# Patient Record
Sex: MALE | Race: WHITE | Employment: FULL TIME | URBAN - NONMETROPOLITAN AREA
[De-identification: names, ages, dates, MRNs, and addresses within clinical notes are randomized per-mention and may not be internally consistent; named-entity substitution may affect disease eponyms.]

---

## 2022-02-24 DIAGNOSIS — Z98.61 POST PTCA: ICD-10-CM

## 2022-02-24 DIAGNOSIS — E55.9 VITAMIN D DEFICIENCY: ICD-10-CM

## 2022-02-24 DIAGNOSIS — I21.9 MYOCARDIAL INFARCTION, UNSPECIFIED MI TYPE, UNSPECIFIED ARTERY (HCC): ICD-10-CM

## 2022-02-24 DIAGNOSIS — I25.10 CORONARY ARTERY DISEASE INVOLVING NATIVE CORONARY ARTERY OF NATIVE HEART WITHOUT ANGINA PECTORIS: Primary | ICD-10-CM

## 2022-02-24 DIAGNOSIS — E78.5 HYPERLIPIDEMIA, UNSPECIFIED HYPERLIPIDEMIA TYPE: ICD-10-CM

## 2022-03-08 RX ORDER — ASPIRIN 81 MG/1
81 TABLET ORAL DAILY
Qty: 90 TABLET | Refills: 1
Start: 2022-03-08

## 2022-03-08 RX ORDER — METOPROLOL SUCCINATE 25 MG/1
25 TABLET, EXTENDED RELEASE ORAL DAILY
Qty: 90 TABLET | Refills: 1
Start: 2022-03-08 | End: 2022-03-16

## 2022-03-08 RX ORDER — NITROGLYCERIN 0.4 MG/1
0.4 TABLET SUBLINGUAL PRN
Qty: 25 TABLET | Refills: 3
Start: 2022-03-08

## 2022-03-08 RX ORDER — ATORVASTATIN CALCIUM 80 MG/1
80 TABLET, FILM COATED ORAL DAILY
Qty: 90 TABLET | Refills: 1
Start: 2022-03-08

## 2022-03-08 RX ORDER — LISINOPRIL 20 MG/1
20 TABLET ORAL 2 TIMES DAILY
Qty: 180 TABLET | Refills: 1
Start: 2022-03-08 | End: 2022-09-22 | Stop reason: DRUGHIGH

## 2022-03-16 ENCOUNTER — HOSPITAL ENCOUNTER (OUTPATIENT)
Age: 47
Discharge: HOME | End: 2022-03-16
Payer: OTHER GOVERNMENT

## 2022-03-16 ENCOUNTER — OFFICE VISIT (OUTPATIENT)
Dept: CARDIOLOGY CLINIC | Age: 47
End: 2022-03-16
Payer: OTHER GOVERNMENT

## 2022-03-16 VITALS
WEIGHT: 194 LBS | HEART RATE: 56 BPM | DIASTOLIC BLOOD PRESSURE: 80 MMHG | OXYGEN SATURATION: 98 % | SYSTOLIC BLOOD PRESSURE: 128 MMHG

## 2022-03-16 DIAGNOSIS — I21.9 MYOCARDIAL INFARCTION, UNSPECIFIED MI TYPE, UNSPECIFIED ARTERY (HCC): ICD-10-CM

## 2022-03-16 DIAGNOSIS — E55.9 VITAMIN D DEFICIENCY: ICD-10-CM

## 2022-03-16 DIAGNOSIS — I25.10 CORONARY ARTERY DISEASE INVOLVING NATIVE CORONARY ARTERY OF NATIVE HEART WITHOUT ANGINA PECTORIS: ICD-10-CM

## 2022-03-16 DIAGNOSIS — I25.10 CORONARY ARTERY DISEASE INVOLVING NATIVE CORONARY ARTERY OF NATIVE HEART WITHOUT ANGINA PECTORIS: Primary | ICD-10-CM

## 2022-03-16 DIAGNOSIS — R06.02 SOB (SHORTNESS OF BREATH): ICD-10-CM

## 2022-03-16 DIAGNOSIS — E78.5 HYPERLIPIDEMIA, UNSPECIFIED HYPERLIPIDEMIA TYPE: ICD-10-CM

## 2022-03-16 DIAGNOSIS — Z98.61 POST PTCA: ICD-10-CM

## 2022-03-16 LAB
ABSOLUTE EOS #: 0.6 K/UL (ref 0–0.4)
ABSOLUTE LYMPH #: 2.1 K/UL (ref 1–4.8)
ABSOLUTE MONO #: 0.5 K/UL (ref 0–1)
ALBUMIN SERPL-MCNC: 4.6 G/DL (ref 3.5–5.2)
ALP BLD-CCNC: 82 U/L (ref 40–129)
ALT SERPL-CCNC: 39 U/L (ref 5–41)
ANION GAP SERPL CALCULATED.3IONS-SCNC: 11 MMOL/L (ref 9–17)
AST SERPL-CCNC: 35 U/L
BASOPHILS # BLD: 1 % (ref 0–2)
BASOPHILS ABSOLUTE: 0.1 K/UL (ref 0–0.2)
BILIRUB SERPL-MCNC: 0.38 MG/DL (ref 0.3–1.2)
BUN BLDV-MCNC: 19 MG/DL (ref 6–20)
BUN/CREAT BLD: 17 (ref 9–20)
CALCIUM SERPL-MCNC: 9.6 MG/DL (ref 8.6–10.4)
CHLORIDE BLD-SCNC: 103 MMOL/L (ref 98–107)
CHOLESTEROL/HDL RATIO: 2.7
CHOLESTEROL: 125 MG/DL
CO2: 26 MMOL/L (ref 20–31)
CREAT SERPL-MCNC: 1.11 MG/DL (ref 0.7–1.2)
DIFFERENTIAL TYPE: YES
EOSINOPHILS RELATIVE PERCENT: 10 % (ref 0–5)
GFR AFRICAN AMERICAN: >60 ML/MIN
GFR NON-AFRICAN AMERICAN: >60 ML/MIN
GFR SERPL CREATININE-BSD FRML MDRD: ABNORMAL ML/MIN/{1.73_M2}
GLUCOSE BLD-MCNC: 109 MG/DL (ref 70–99)
HCT VFR BLD CALC: 41.7 % (ref 41–53)
HDLC SERPL-MCNC: 46 MG/DL
HEMOGLOBIN: 14.3 G/DL (ref 13.5–17.5)
LDL CHOLESTEROL: 62 MG/DL (ref 0–130)
LYMPHOCYTES # BLD: 34 % (ref 13–44)
MAGNESIUM: 2.1 MG/DL (ref 1.6–2.6)
MCH RBC QN AUTO: 31.9 PG (ref 26–34)
MCHC RBC AUTO-ENTMCNC: 34.2 G/DL (ref 31–37)
MCV RBC AUTO: 93.1 FL (ref 80–100)
MONOCYTES # BLD: 7 % (ref 5–9)
PATIENT FASTING?: YES
PDW BLD-RTO: 13.8 % (ref 12.1–15.2)
PLATELET # BLD: 265 K/UL (ref 140–450)
POTASSIUM SERPL-SCNC: 5.3 MMOL/L (ref 3.7–5.3)
RBC # BLD: 4.48 M/UL (ref 4.5–5.9)
SEG NEUTROPHILS: 48 % (ref 39–75)
SEGMENTED NEUTROPHILS ABSOLUTE COUNT: 2.9 K/UL (ref 2.1–6.5)
SODIUM BLD-SCNC: 140 MMOL/L (ref 135–144)
TOTAL PROTEIN: 7.6 G/DL (ref 6.4–8.3)
TRIGL SERPL-MCNC: 84 MG/DL
TSH SERPL DL<=0.05 MIU/L-ACNC: 1.99 MIU/L (ref 0.3–5)
VITAMIN D 25-HYDROXY: 19.8 NG/ML
WBC # BLD: 6.2 K/UL (ref 3.5–11)

## 2022-03-16 PROCEDURE — 82306 VITAMIN D 25 HYDROXY: CPT

## 2022-03-16 PROCEDURE — 85025 COMPLETE CBC W/AUTO DIFF WBC: CPT

## 2022-03-16 PROCEDURE — 84443 ASSAY THYROID STIM HORMONE: CPT

## 2022-03-16 PROCEDURE — 99204 OFFICE O/P NEW MOD 45 MIN: CPT | Performed by: INTERNAL MEDICINE

## 2022-03-16 PROCEDURE — 83735 ASSAY OF MAGNESIUM: CPT

## 2022-03-16 PROCEDURE — 80061 LIPID PANEL: CPT

## 2022-03-16 PROCEDURE — 93005 ELECTROCARDIOGRAM TRACING: CPT

## 2022-03-16 PROCEDURE — 36415 COLL VENOUS BLD VENIPUNCTURE: CPT

## 2022-03-16 PROCEDURE — 80053 COMPREHEN METABOLIC PANEL: CPT

## 2022-03-16 RX ORDER — CLOPIDOGREL BISULFATE 75 MG/1
75 TABLET ORAL DAILY
Qty: 30 TABLET | Refills: 11 | Status: SHIPPED | OUTPATIENT
Start: 2022-03-16 | End: 2022-09-22 | Stop reason: ALTCHOICE

## 2022-03-16 RX ORDER — METOPROLOL SUCCINATE 25 MG/1
12.5 TABLET, EXTENDED RELEASE ORAL EVERY EVENING
COMMUNITY

## 2022-03-16 NOTE — PATIENT INSTRUCTIONS
Decrease metoprolol  25 mg to 1/2 tab daily    Switch Brilinta to plavix after you are done with brilinta.     Take blood pressure daily different times of the day   Keep log bring in when you have stress test.

## 2022-03-16 NOTE — PROGRESS NOTES
Ov DR Natalya Vega establish care   Annabella Drilling to [place] ED transferred   To [facility]. Had MI 10/31/21  Had PTCA x3   Had chest pain for 1 days prior  Came and went   The day he went to ED  Chest pain that didn't go away. No sob at that time   is in cardiac rehab at   [facility].  For 1 more week. Was being see per DR Sarahy Wong. This year insurance will not   [facility]. Pt in the [address] [place] [address],[address] working   In [place]  Pt is a Navigator.  with 4 children   3 boys 1 girl   Wife Sandy Summers teaches theology  at [employer] in [facility]. No chest pain since   No sob   occ dizziness when standing up   bp at home low 100's. Bedside echo done. Decrease toprol to 1/2 tab daily. Will do treadmill stress test and   Echo  Call with results  Will need to clear patient to fly. And letter to travel over sees. In non flying compacity in May.

## 2022-03-17 LAB
EKG ATRIAL RATE: 58 BPM
EKG P AXIS: 26 DEGREES
EKG P-R INTERVAL: 118 MS
EKG Q-T INTERVAL: 408 MS
EKG QRS DURATION: 80 MS
EKG QTC CALCULATION (BAZETT): 400 MS
EKG R AXIS: 44 DEGREES
EKG T AXIS: 67 DEGREES
EKG VENTRICULAR RATE: 58 BPM

## 2022-03-17 PROCEDURE — 93010 ELECTROCARDIOGRAM REPORT: CPT | Performed by: INTERNAL MEDICINE

## 2022-03-21 NOTE — PROGRESS NOTES
Edith Vargas M.D. [address] [place], [place] [address] [address]  [phone]        2022        Karel Pearl MD  [address] [facility]    RE:   Malik Morley  :  1975    Dear Dr. Jailene Pearl:    [address]:  1. Acute anterior wall ST elevation myocardial infarction on 10/31/2021.  2.  Status post cardiac catheterization showing occluded LAD 75% intermediate, circumflex 90%, first OM with stenting of each with an EF of 46%. HISTORY OF PRESENT ILLNESS:  I had the pleasure of seeing Mr. LEZAMA in the office on 2022. Mr. Romero Boggs is a pleasant 49-year-old gentleman who had an acute anterior wall myocardial infarction on 10/31/2021. He had never had a cardiac history. 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 He had never had chest pain or chest discomfort. Mr. Romero Boggs had developed chest pain for several minutes two days prior to admission. It only lasted several minutes and subsided. The day before, he again had chest pain that subsided spontaneously. On 10/31/2021, he developed chest pain and it continued and did not subside. No nausea or diaphoresis. Chest pain was described as heaviness that continued, therefore he went to [place] emergency room where EKG showed acute anterior wall myocardial infarction. We accepted him to [facility] in [place] and took him directly to the cath lab where we did a cardiac catheterization. The catheterization showed severe coronary artery disease. The left coronary artery was a large and dominant vessel, gave rise to PDA and posterior ventricular branch. The right coronary artery had mild plaque disease of 20% to 30%. The left main trunk was unremarkable. The LAD was a very large vessel that wrapped around the apex. There was a stump occlusion just after the origin of the LAD. There was some retrograde filling from the intermediate branch. The intermediate branch of the circumflex had 70% to 75% lesion in its proximal portion and was a large vessel. The circumflex gives rise to a moderate-sized OM where there was a 90% lesion. The EF was in the 45% to 50% range. I did angioplasty and stent placement opening the LAD and placing a 3.0 x 23 mm drug-eluting Xience stent. I also did angioplasty and stent placement in the intermediate branch of the circumflex using a 2.75 x 38 mm drug-eluting Xience stent. The OM was also stented with a 2.5 x 38 mm drug-eluting Xience stent with a good end result. He was discharged and did cardiac rehab in [place]. He saw Dr. Samm Sarmiento who ordered an echocardiogram and a stress test which were not covered at [facility] in [place]. Therefore, he is seeing me to establish his cardiac care. He has done excellent. He has been exercising without discomfort. He has no shortness of breath. No chest pain. His energy level is back to baseline. He has had no syncope, near syncope, lightheadedness,or dizziness. Denies any palpitations. He has never had a history of chest pain or chest discomfort prior to his myocardial infarction and he worked out on a regular basis. An echocardiogram the following day after his catheterization showed an EF of 46% with mild hypokinesis in the inferoseptal, anteroseptal region of the left ventricle. He has completed cardiac rehab in the [facility]. He has noted some lightheadedness when he first stands from a sitting position or from a lying position. He has had no syncope or near syncope. CARDIAC RISK FACTORS:  Prior MI:  Positive. PTCA:  Positive. Hypertension:  Negative. Peripheral Vascular Disease:  Negative. Other Family Members:  Negative. Smoking:  Negative.     MEDICATIONS PRIOR TO ADMISSION:  He is currently on aspirin 81 mg daily, Lipitor 80 mg daily, Proventil 90 mg b.i.d., lisinopril 20 mg b.i.d., Toprol-XL 25 mg daily. PAST MEDICAL AND SURGICAL HISTORY:  1. He had a tonsillectomy on 1985.  2.  He had the cardiac catheterization on 10/31/2021. FAMILY HISTORY:  Mother is alive at 80. Father  of multiple myeloma. He has three brothers, 46, 61, and 58 who are alive and well and a sister 59 is alive and well. SOCIAL HISTORY:  He is in the [employer]. 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 . Four children, three boys and one girl. His wife, Zan Chaudhry, teaches Theology at [employer] in [facility]. REVIEW OF SYSTEMS:  Cardiac as above. Other systems reviewed including constitutional, eyes, ears, nose and throat, cardiovascular, respiratory, GI, , musculoskeletal, integumentary, neurologic, psychiatric, endocrine, hematologic and allergic/immunologic are negative except for what is described above. No weight loss or weight gain. No change in bowel habits. No blood in stools. No fevers, sweats or chills. PHYSICAL EXAMINATION:  VITAL SIGNS:  His blood pressure was 128/80 with a heart rate of 56 and regular. Respiratory rate 18. O2 saturation 98%. Weight 194 pounds. GENERAL:  He is a pleasant 78-year-old gentleman. Denied pain. He was oriented to person, place and time. Answered questions appropriately. SKIN:  No unusual skin changes. HEENT:  The pupils are equally round and intact. Mucous membranes were dry. NECK:  No JVD. Good carotid pulses. No carotid bruits. No lymphadenopathy or thyromegaly. CARDIOVASCULAR EXAM:  S1 and S2 were normal.  No S3 or S4. Soft systolic blowing type murmur. No diastolic murmur. PMI was normal.  No lift, thrust, or pericardial friction rub. LUNGS:  Quite clear to auscultation and percussion. ABDOMEN:  Soft and nontender. Good bowel sounds. The aorta was not enlarged. No hepatomegaly, splenomegaly. EXTREMITIES:  Good femoral pulses. Good pedal pulses. No pedal edema.   Skin was warm and dry.  No calf tenderness. Nail beds pink. Good cap refill. PULSES:  Bilateral symmetrical radial, brachial and carotid pulses. No carotid bruits. Good femoral and pedal pulses. NEUROLOGIC EXAM:  Within normal limits. PSYCHIATRIC EXAM:  Within normal limits. LABORATORY DATA:  His sodium was 140, potassium 5.3, BUN 19, creatinine 1.11, GFR greater than 60. Magnesium 2.1. Glucose 109. Calcium was 9.6. Cholesterol 125, HDL 46, LDL 62, triglycerides 84. ALT was 39, AST was 35. TSH was 1.99. Vitamin D was 19.8. White count 6.2, hemoglobin 14.3 with a platelet count 146,556. His EKG showed mild sinus bradycardia at 58 beats per minute, otherwise normal EKG. Chest x-ray not done. IMPRESSION:  1. Status post acute anterior wall myocardial infection on 10/31/2021.  2.  Acute cardiac catheterization on 10/31/2021, showing an occluded LAD just after its origin with 75% disease in the intermediate branch of the circumflex, 90% disease in the OM branch of the circumflex with unremarkable large dominant right coronary artery with an EF of 45% to 50%. 3.  Angioplasty of the LAD placing a 3.0 x 23 mm drug-eluting Xience stent and angioplasty of the intermediate branch of the circumflex placing a 2.75 x 38 mm drug-eluting Xience stent and angioplasty of the OM branch of the circumflex with a 2.5 x 38 mm drug-eluting Xience stent. 4.  Completion of cardiac rehab with excellent exercise capacity and no issues during rehab.  5.  Hyperlipidemia, under excellent control. 6.  Mild bradycardia with heart rate of 58 beats per minute. 7.  Normal EKG, except for very mild bradycardia. PLAN:  1.  Echocardiogram to look at LV size and function. 2.  Treadmill stress test as he has a normal EKG. 3.  Continue risk modification as he is already doing. 4.  Decrease Toprol to 25 mg half a tablet daily. 5.  Stop Brilinta and start Plavix 75 mg daily.   I did stop the Brilinta and place him on Plavix since it is only one a day with the Plavix. DISCUSSION:  Mr. Macy Robin had an anterior wall myocardial infarction on 10/31. Fortunately, he got in early and his LAD was reopened and stented. His OM and intermediate branch of the circumflex were also stented. He has had no chest pain or chest discomfort, shortness of breath, loss of energy since that time. He completed cardiac rehab and he has done excellent cardiac rehab. Because his heart rate was 58 here, I will decrease his Toprol from 25 mg to half a tablet or 12.5 mg daily. We will do a treadmill stress test and echocardiogram to look at his exercise capacity and his LV function. At this point, there is no limitations to his activity. There is no weight restriction. I did change his medication from Brilinta to Plavix. From my standpoint, he is cleared to fly. He has also cleared to travel overseas in a nonflying capacity in May. His prognosis is excellent. I will check his EF, but I would expect that it is back in the normal range again. I did do a bedside echocardiogram which did show normal LV function. We will do an official echocardiogram also to document. I will see him again in 6 months and if he is doing well at that time, we will see him on a yearly basis. He is very fortunate that he came to the hospital early and could get intervention really in the course of his myocardial infarction. Thank you very much for allowing me the privilege of seeing Mr. BARTH. If you have any questions on my thoughts, please do not hesitate to contact me.         Mann Roper    D: 03/20/2022 15:13:11     T: 03/20/2022 15:20:51     [email or web address]  Job#: [id number]   Doc#: [id number]

## 2022-03-22 ENCOUNTER — HOSPITAL ENCOUNTER (OUTPATIENT)
Dept: NON INVASIVE DIAGNOSTICS | Age: 47
Discharge: HOME | End: 2022-03-22
Payer: OTHER GOVERNMENT

## 2022-03-22 ENCOUNTER — OFFICE VISIT (OUTPATIENT)
Dept: CARDIOLOGY CLINIC | Age: 47
End: 2022-03-22
Payer: OTHER GOVERNMENT

## 2022-03-22 VITALS
WEIGHT: 189 LBS | OXYGEN SATURATION: 97 % | DIASTOLIC BLOOD PRESSURE: 74 MMHG | SYSTOLIC BLOOD PRESSURE: 115 MMHG | HEART RATE: 62 BPM

## 2022-03-22 DIAGNOSIS — R06.02 SOB (SHORTNESS OF BREATH): ICD-10-CM

## 2022-03-22 DIAGNOSIS — R07.9 CHEST PAIN, UNSPECIFIED TYPE: Primary | ICD-10-CM

## 2022-03-22 DIAGNOSIS — I25.10 CORONARY ARTERY DISEASE INVOLVING NATIVE CORONARY ARTERY OF NATIVE HEART WITHOUT ANGINA PECTORIS: ICD-10-CM

## 2022-03-22 LAB
LV EF: 60 %
LVEF MODALITY: NORMAL

## 2022-03-22 PROCEDURE — 93306 TTE W/DOPPLER COMPLETE: CPT

## 2022-03-22 PROCEDURE — 99214 OFFICE O/P EST MOD 30 MIN: CPT | Performed by: INTERNAL MEDICINE

## 2022-03-22 PROCEDURE — 93017 CV STRESS TEST TRACING ONLY: CPT

## 2022-03-22 NOTE — PROGRESS NOTES
Ov Dr Brigida Huddleston follow up same  Day echo and treadmill. No chest pain or sob  No dizziness. Pt is cleared to fly   Will  disk and records   On Monday.

## 2022-03-22 NOTE — PROCEDURES
[place] [id number]                              CARDIAC STRESS TEST    PATIENT NAME: Bipin Osorio                   :        1975  MED REC NO:   [id number]                              ROOM:  ACCOUNT NO:   [de-identified]                           ADMIT DATE: 2022  PROVIDER:     Suzie Arzola    DATE OF STUDY:  2022    TREADMILL STRESS TEST    He exercised 8 minutes on an accelerated Spike protocol achieving 13.2  METs. His peak heart rate was 160 which is 93% of maximum predicted  heart rate. He had no chest pain, no ST depression, and no arrhythmias. This was overall normal treadmill stress test with no indication of  ischemia at peak workload. Alber Clement    D: 2022 11:33:42       T: 2022 12:42:06     [email or web address]  Job#: [id number]     Doc#: [id number]    CC:  Sam Mcelroy.  Kendall Park MD

## 2022-03-27 NOTE — PROGRESS NOTES
Patience Parks MD  [facility]  [address], [place] [address]  [phone]      2022      Karen Villa. Schuyler Rowan MD  [address] [facility], [address] [facility]      RE:   Jesica Cristobal  :  1975      Dear Dr. Schuyler Rowan:    I met with Mr. Jesica Cristobal after his echocardiogram and his cardiac stress test done today. His echocardiogram looks excellent. His ejection fraction is now normal with an EF of 60%. There are no wall motion abnormalities present. His stress test was excellent. He has a very good exercise capacity. He exercised 8 minutes on an accelerated Spike protocol achieving a maximum heart rate of 160 which is 93% of maximum predicted heart rate. He achieved 13.2 METs. He had no EKG changes and no chest pain during his stress test.  It was overall normal stress test with an excellent exercise capacity. From a cardiac standpoint, Mr. Emerald Gould is on unlimited activity. I pushed him very hard on his stress test today and there were absolutely no issues that arose. His LV function has also normalized with an EF of 60%. As I outlined in my initial H and P, he has no residual areas of stenosis. His LAD has 0% residual stenosis as does his intermediate and OM branch of the circumflex. His right coronary artery has mild plaque disease of 20% to 30%. I will include a copy of my initial H and P as well as a copy of his stress test and his cardiac catheterization, as well as the echocardiogram.  Again, there are no restrictions from a cardiac standpoint and with now his essentially normal coronary arteries after stenting of the LAD and circumflex and normal LV function, his risk of any cardiac event is extremely low at this time. His risk factors have been modified very nicely. If there is any question concerning Mr. Bah's cardiac status, please feel free to contact me.   Thank you very much.    Sincerely,        Juan Alberto Goldberg    D: 03/22/2022 11:31:55     T: 03/22/2022 11:34:16     [email or web address]  Job#: [id number]   Doc#: [id number]

## 2022-09-19 ENCOUNTER — HOSPITAL ENCOUNTER (OUTPATIENT)
Age: 47
Discharge: HOME | End: 2022-09-19
Payer: OTHER GOVERNMENT

## 2022-09-19 DIAGNOSIS — I21.9 MYOCARDIAL INFARCTION, UNSPECIFIED MI TYPE, UNSPECIFIED ARTERY (HCC): ICD-10-CM

## 2022-09-19 DIAGNOSIS — E78.5 HYPERLIPIDEMIA, UNSPECIFIED HYPERLIPIDEMIA TYPE: ICD-10-CM

## 2022-09-19 DIAGNOSIS — I25.10 CORONARY ARTERY DISEASE INVOLVING NATIVE CORONARY ARTERY OF NATIVE HEART WITHOUT ANGINA PECTORIS: ICD-10-CM

## 2022-09-19 DIAGNOSIS — E55.9 VITAMIN D DEFICIENCY: ICD-10-CM

## 2022-09-19 LAB
ABSOLUTE EOS #: 0.11 K/UL (ref 0–0.4)
ABSOLUTE LYMPH #: 2.81 K/UL (ref 1–4.8)
ABSOLUTE MONO #: 0.16 K/UL (ref 0–1)
ALBUMIN SERPL-MCNC: 4.6 G/DL (ref 3.5–5.2)
ALP BLD-CCNC: 95 U/L (ref 40–129)
ALT SERPL-CCNC: 28 U/L (ref 5–41)
ANION GAP SERPL CALCULATED.3IONS-SCNC: 9 MMOL/L (ref 9–17)
AST SERPL-CCNC: 29 U/L
BASOPHILS # BLD: 1 % (ref 0–2)
BASOPHILS ABSOLUTE: 0.05 K/UL (ref 0–0.2)
BILIRUB SERPL-MCNC: 0.6 MG/DL (ref 0.3–1.2)
BUN BLDV-MCNC: 13 MG/DL (ref 6–20)
BUN/CREAT BLD: 13 (ref 9–20)
CALCIUM SERPL-MCNC: 9.6 MG/DL (ref 8.6–10.4)
CHLORIDE BLD-SCNC: 100 MMOL/L (ref 98–107)
CHOLESTEROL/HDL RATIO: 2.7
CHOLESTEROL: 128 MG/DL
CO2: 27 MMOL/L (ref 20–31)
CREAT SERPL-MCNC: 0.99 MG/DL (ref 0.7–1.2)
DIFFERENTIAL TYPE: YES
EKG ATRIAL RATE: 56 BPM
EKG P AXIS: 23 DEGREES
EKG P-R INTERVAL: 132 MS
EKG Q-T INTERVAL: 414 MS
EKG QRS DURATION: 84 MS
EKG QTC CALCULATION (BAZETT): 399 MS
EKG R AXIS: 67 DEGREES
EKG T AXIS: 70 DEGREES
EKG VENTRICULAR RATE: 56 BPM
EOSINOPHILS RELATIVE PERCENT: 2 % (ref 0–5)
GFR AFRICAN AMERICAN: >60 ML/MIN
GFR NON-AFRICAN AMERICAN: >60 ML/MIN
GFR SERPL CREATININE-BSD FRML MDRD: ABNORMAL ML/MIN/{1.73_M2}
GLUCOSE BLD-MCNC: 102 MG/DL (ref 70–99)
HCT VFR BLD CALC: 40.7 % (ref 41–53)
HDLC SERPL-MCNC: 48 MG/DL
HEMOGLOBIN: 13.8 G/DL (ref 13.5–17.5)
LDL CHOLESTEROL: 66 MG/DL (ref 0–130)
LYMPHOCYTES # BLD: 53 % (ref 13–44)
MAGNESIUM: 1.9 MG/DL (ref 1.6–2.6)
MCH RBC QN AUTO: 31.3 PG (ref 26–34)
MCHC RBC AUTO-ENTMCNC: 33.8 G/DL (ref 31–37)
MCV RBC AUTO: 92.7 FL (ref 80–100)
MONOCYTES # BLD: 3 % (ref 5–9)
MORPHOLOGY: ABNORMAL
PDW BLD-RTO: 13 % (ref 12.1–15.2)
PLATELET # BLD: 266 K/UL (ref 140–450)
POTASSIUM SERPL-SCNC: 5.4 MMOL/L (ref 3.7–5.3)
RBC # BLD: 4.39 M/UL (ref 4.5–5.9)
SEG NEUTROPHILS: 41 % (ref 39–75)
SEGMENTED NEUTROPHILS ABSOLUTE COUNT: 2.17 K/UL (ref 2.1–6.5)
SODIUM BLD-SCNC: 136 MMOL/L (ref 135–144)
TOTAL PROTEIN: 7.6 G/DL (ref 6.4–8.3)
TRIGL SERPL-MCNC: 70 MG/DL
TSH SERPL DL<=0.05 MIU/L-ACNC: 2.41 UIU/ML (ref 0.3–5)
VITAMIN D 25-HYDROXY: 44.2 NG/ML
WBC # BLD: 5.3 K/UL (ref 3.5–11)

## 2022-09-19 PROCEDURE — 36415 COLL VENOUS BLD VENIPUNCTURE: CPT

## 2022-09-19 PROCEDURE — 82306 VITAMIN D 25 HYDROXY: CPT

## 2022-09-19 PROCEDURE — 80053 COMPREHEN METABOLIC PANEL: CPT

## 2022-09-19 PROCEDURE — 85025 COMPLETE CBC W/AUTO DIFF WBC: CPT

## 2022-09-19 PROCEDURE — 80061 LIPID PANEL: CPT

## 2022-09-19 PROCEDURE — 93005 ELECTROCARDIOGRAM TRACING: CPT

## 2022-09-19 PROCEDURE — 83735 ASSAY OF MAGNESIUM: CPT

## 2022-09-19 PROCEDURE — 84443 ASSAY THYROID STIM HORMONE: CPT

## 2022-09-19 PROCEDURE — 93010 ELECTROCARDIOGRAM REPORT: CPT | Performed by: INTERNAL MEDICINE

## 2022-09-22 ENCOUNTER — OFFICE VISIT (OUTPATIENT)
Dept: CARDIOLOGY CLINIC | Age: 47
End: 2022-09-22
Payer: OTHER GOVERNMENT

## 2022-09-22 VITALS
DIASTOLIC BLOOD PRESSURE: 70 MMHG | HEART RATE: 61 BPM | OXYGEN SATURATION: 98 % | WEIGHT: 188 LBS | SYSTOLIC BLOOD PRESSURE: 120 MMHG

## 2022-09-22 DIAGNOSIS — E78.5 HYPERLIPIDEMIA, UNSPECIFIED HYPERLIPIDEMIA TYPE: ICD-10-CM

## 2022-09-22 DIAGNOSIS — I25.10 CORONARY ARTERY DISEASE INVOLVING NATIVE CORONARY ARTERY OF NATIVE HEART WITHOUT ANGINA PECTORIS: Primary | ICD-10-CM

## 2022-09-22 DIAGNOSIS — R07.9 CHEST PAIN, UNSPECIFIED TYPE: ICD-10-CM

## 2022-09-22 PROCEDURE — 99214 OFFICE O/P EST MOD 30 MIN: CPT | Performed by: INTERNAL MEDICINE

## 2022-09-22 RX ORDER — LISINOPRIL 20 MG/1
10 TABLET ORAL 2 TIMES DAILY
COMMUNITY
End: 2022-10-07 | Stop reason: DRUGHIGH

## 2022-09-22 NOTE — PATIENT INSTRUCTIONS
May Stop Plavix now     Stay on ASA    Will Decrease Lisinopril 20 mg to   1/2 tablet twice a day (will now be   10 mg twice a day)    Monitor BP/HR daily - different times of the day   -call in 2 weeks with readings    Will follow up in 1 yr

## 2022-09-22 NOTE — PROGRESS NOTES
Ov Dr. Yanira Smith for 6 month follow up   June 8th-had another cath d/t needing it  To return to work -was not having any issues  Back to work full time no restrictions  Doing well   C/o some lightheadedness when getting up   From chair or bed in am   Hoping to retired in 3 yrs   3 boys 1 girl   Oldest is dt[place] - stay home mom with   2 children 3rd on the way  Son -Michelle Moran - 22   Son Grady Sheriff is in [facility] [employer]   Son -        - 16 is a JR in [address] [place] [address]         May Stop Plavix now     Stay on ASA    Will Decrease Lisinopril 20 mg to   1/2 tablet twice a day (will now be   10 mg twice a day)    Monitor BP/HR daily - different times of the day   -call in 2 weeks with readings    Will follow up in 1 yr

## 2022-09-26 NOTE — PROGRESS NOTES
to 50%. We did angioplasty and stent placement, opening the LAD and placing a 3.0 x 23 mm drug-eluting Xience stent. We also placed a stent in the intermediate branch of the circumflex using a 2.75 x 38 mm drug-eluting Xience stent. The OM was stented with a 2.5 x 38 mm drug-eluting Xience stent with a good end result. I had done a stress test and echocardiogram when I had seen him in 2022. However, this was in preparation for going back to work. However, the [employer] required a repeat catheterization, which was done on 2022, in [place]. This was essentially normal with no restenosis of any of his stents. Normal LV function. He has done excellent since that time. He is exercising on a regular basis. He still is working at [employer] in [place]. 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 He has four years left until longterm, and therefore, he will not go back to school but will learn there on the job. He does complain of some lightheadedness when he first stands up from a chair or from bed in the morning. CARDIAC RISK FACTORS:  Prior MI:  Positive. PTCA:  Positive. Hypertension:  Negative. Peripheral Vascular Disease:  Negative. Other Family Members:  Negative. Smoking:  Negative. MEDICATIONS AT THIS TIME:  He is on aspirin 81 mg daily, Lipitor 80 mg daily, lisinopril 20 mg b.i.d., Toprol-XL 25 mg half a tablet in the evening and Plavix 75 mg daily. PAST MEDICAL AND SURGICAL HISTORY:  1. Tonsillectomy on 1985.  2.  Cardiac catheterization on 10/31/2021, as stated previously. 3.  Repeat catheterization on 2022, by the [employer] showing widely patent stents with mild plaque disease. FAMILY HISTORY:  Mother alive at 80. Father  of multiple myeloma. Three brothers, 46, 64 and 61, alive and well. A sister, 72, alive and well.     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  to Stefan Dinh who teaches theology at [employer] in [facility] one day a week. He has four children with a daughter, Padma Rawls, who is 29, has two children with a third on the way; Kyra Tapia is, 22; Carolina Vasquez, 21, in [facility]; and his youngest son is 16, in [employer], wants to go into engineering. Kyra Tapia does not smoke or drink alcohol. REVIEW OF SYSTEMS:  Cardiac as above. Other systems reviewed including constitutional, eyes, ears, nose and throat, cardiovascular, respiratory, GI, , musculoskeletal, integumentary, neurologic, endocrine, hematologic and allergic/immunologic are negative except for what is described above. No weight loss or weight gain. No change in bowel habits, no blood in stools. No fevers, sweats or chills. PHYSICAL EXAMINATION:  VITAL SIGNS:  His blood pressure was 120/70 with a heart rate of 61 and regular. Respirations 18. O2 sat 98%. Weight 188 pounds. GENERAL:  He is a pleasant 73-year-old gentleman. Denied pain. He was oriented to person, place and time. Answered questions appropriately. SKIN:  No unusual skin changes. HEENT:  The pupils are equally round and intact. Mucous membranes were dry. NECK:  No JVD. Good carotid pulses. No carotid bruits. No lymphadenopathy or thyromegaly. CARDIOVASCULAR EXAM:  S1 and S2 were normal.  No S3 or S4. Soft systolic blowing type murmur. No diastolic murmur. PMI was normal.  No lift, thrust, or pericardial friction rub. LUNGS:  Clear to auscultation and percussion. ABDOMEN:  Soft and nontender. Good bowel sounds. EXTREMITIES:  Good femoral pulses. Good pedal pulses. No pedal edema. Skin was warm and dry. No calf tenderness. Nail beds pink. Good cap refill. PULSES:  Bilateral symmetrical radial, brachial and carotid pulses. No carotid bruits. Good femoral and pedal pulses. NEUROLOGIC EXAM:  Within normal limits. PSYCHIATRIC EXAM:  Within normal limits.     LABORATORY DATA: His sodium was 136, potassium 5.4, BUN 13, creatinine 0.99, GFR greater than 60, magnesium 1.9, glucose 102, calcium 9.6. Cholesterol 128, HDL 48, LDL 66, triglycerides 70. ALT was 28, AST was 29. TSH 2.41. Vitamin D 44.2. White count 5.3, hemoglobin 13.8 with a platelet count of 922,405. His EKG showed normal sinus rhythm and was normal.    His echocardiogram on 06/08/2022, was normal with an EF of 55%. IMPRESSION:  1. Coronary artery disease. 2.  Anterior myocardial infarction on 10/31/2021, with a catheterization showing occluded LAD after its origin, with 75% disease in the intermediate branch of the circumflex, 90% disease in the OM branch of the circumflex, with unremarkable large right coronary artery, EF of 45% to 50%. 3.  Angioplasty of the LAD placing 3.0 x 23 mm drug-eluting Xience stent, angioplasty of the intermediate branch of the circumflex placing 2.75 x 38 mm Xience stent, and angioplasty of the OM branch of the circumflex placing 2.5 x 38 mm drug-eluting Xience stent. 4.  Repeat catheterization on [facility]08/2022, by [facility], that showed widely patent stents with mild plaque disease. 5.  Normal EF of 60% by an echocardiogram by the  on 06/08/2022. 6.  Hyperlipidemia, under excellent control. 7.  Mild hypotension now with dizziness when standing due to his blood pressure at home being in the 95 to 100 range. PLAN:  1. Stop Plavix as it has been one year since his angioplasty. 2.  Decrease lisinopril from 20 mg b.i.d. to 10 mg b.i.d.  3.  He will take his blood pressure daily and call us in two weeks to make sure that it is in the 120 range at home, and if it is not, we will decrease his lisinopril again to 10 mg once a day at bedtime. 4.  See in one year. DISCUSSION:  Mr. Curt Halsted is doing excellent. He is exercising on a regular basis and feels very good.   He had a repeat catheterization on 06/08, before he could return as being a navigator for [employer] and his catheterization was essentially normal with widely patent stents. He is on an excellent risk modification program.    His blood pressure does run somewhat low and we will decrease his lisinopril from 20 mg to 10 mg b.i.d.    I am delighted at his overall well-being. I did tell him if he had any \"soft signs\" of any issues such as mild shortness of breath or loss of energy or, of course, any chest pain, I would want to know immediately. I will consider doing a stress test in two to three years as he had minimal symptoms prior to his myocardial infarction. Thank you very much for allowing me the privilege of seeing Mr. NOLAN. If you have any questions on my thoughts, please do not hesitate to contact me.     Sincerely,        Lianne Medina    D: 09/22/2022 14:54:28     T: 09/22/2022 15:00:56     [email or web address]  Job#: [id number]   Doc#: [id number]

## 2022-10-07 ENCOUNTER — TELEPHONE (OUTPATIENT)
Dept: CARDIOLOGY CLINIC | Age: 47
End: 2022-10-07

## 2022-10-07 RX ORDER — LISINOPRIL 20 MG/1
10 TABLET ORAL DAILY
Qty: 30 TABLET | Refills: 0 | Status: SHIPPED
Start: 2022-10-07 | End: 2022-10-27 | Stop reason: DRUGHIGH

## 2022-10-07 NOTE — TELEPHONE ENCOUNTER
Pt called in with bp log scanned into chart   Pt to decrease lisinopril 20 to 1/2 tab daily  And call again in 2 weeks.

## 2022-10-27 ENCOUNTER — TELEPHONE (OUTPATIENT)
Dept: CARDIOLOGY CLINIC | Age: 47
End: 2022-10-27

## 2022-10-27 RX ORDER — LISINOPRIL 5 MG/1
5 TABLET ORAL EVERY EVENING
COMMUNITY

## 2022-10-27 RX ORDER — LISINOPRIL 5 MG/1
5 TABLET ORAL DAILY
Qty: 30 TABLET | Refills: 11 | Status: SHIPPED | OUTPATIENT
Start: 2022-10-27

## 2022-10-27 NOTE — TELEPHONE ENCOUNTER
Bp readings reviewed  Already taking lisinpril 10 mg in evening   Will decrease to 5 mg   Per Dr. Petty Ours  Pt notified

## 2022-11-15 ENCOUNTER — TELEPHONE (OUTPATIENT)
Dept: CARDIOLOGY CLINIC | Age: 47
End: 2022-11-15

## 2022-12-21 RX ORDER — METOPROLOL SUCCINATE 25 MG/1
12.5 TABLET, EXTENDED RELEASE ORAL EVERY EVENING
Qty: 45 TABLET | Refills: 3 | Status: SHIPPED | OUTPATIENT
Start: 2022-12-21

## 2023-02-14 RX ORDER — ATORVASTATIN CALCIUM 80 MG/1
80 TABLET, FILM COATED ORAL DAILY
Qty: 90 TABLET | Refills: 3 | Status: SHIPPED | OUTPATIENT
Start: 2023-02-14

## 2023-03-02 PROBLEM — I25.10 CAD (CORONARY ARTERY DISEASE): Status: ACTIVE | Noted: 2023-03-02

## 2023-03-02 PROBLEM — Z98.61 POST PTCA: Status: ACTIVE | Noted: 2023-03-02

## 2023-03-02 PROBLEM — I21.9 MYOCARDIAL INFARCT (MULTI): Status: ACTIVE | Noted: 2023-03-02

## 2023-03-02 PROBLEM — E78.2 MIXED HYPERLIPIDEMIA: Status: ACTIVE | Noted: 2023-03-02

## 2023-03-02 RX ORDER — ATORVASTATIN CALCIUM 80 MG/1
TABLET, FILM COATED ORAL
COMMUNITY
Start: 2021-11-05

## 2023-03-02 RX ORDER — NITROGLYCERIN 0.4 MG/1
1 TABLET SUBLINGUAL AS NEEDED
COMMUNITY
Start: 2021-11-05

## 2023-03-02 RX ORDER — LISINOPRIL 2.5 MG/1
1 TABLET ORAL 2 TIMES DAILY
COMMUNITY
Start: 2021-11-05 | End: 2023-03-08 | Stop reason: ALTCHOICE

## 2023-03-02 RX ORDER — METOPROLOL SUCCINATE 25 MG/1
0.5 TABLET, EXTENDED RELEASE ORAL DAILY
COMMUNITY
Start: 2021-11-05

## 2023-03-02 RX ORDER — ASPIRIN 81 MG/1
1 TABLET ORAL DAILY
COMMUNITY
Start: 2021-11-05

## 2023-03-08 ENCOUNTER — OFFICE VISIT (OUTPATIENT)
Dept: PRIMARY CARE | Facility: CLINIC | Age: 48
End: 2023-03-08
Payer: OTHER GOVERNMENT

## 2023-03-08 VITALS
HEART RATE: 74 BPM | OXYGEN SATURATION: 93 % | DIASTOLIC BLOOD PRESSURE: 78 MMHG | WEIGHT: 189.8 LBS | BODY MASS INDEX: 25.71 KG/M2 | HEIGHT: 72 IN | SYSTOLIC BLOOD PRESSURE: 120 MMHG

## 2023-03-08 DIAGNOSIS — Z00.00 WELL ADULT EXAM: Primary | ICD-10-CM

## 2023-03-08 PROCEDURE — 99396 PREV VISIT EST AGE 40-64: CPT | Performed by: FAMILY MEDICINE

## 2023-03-08 PROCEDURE — 1036F TOBACCO NON-USER: CPT | Performed by: FAMILY MEDICINE

## 2023-03-08 RX ORDER — LISINOPRIL 5 MG/1
5 TABLET ORAL DAILY
COMMUNITY

## 2023-03-08 ASSESSMENT — ENCOUNTER SYMPTOMS
PALPITATIONS: 0
HEADACHES: 0
DECREASED CONCENTRATION: 0
CONSTIPATION: 0
SLEEP DISTURBANCE: 0
NAUSEA: 0
ABDOMINAL PAIN: 0
NERVOUS/ANXIOUS: 0
ACTIVITY CHANGE: 0
SHORTNESS OF BREATH: 0
WHEEZING: 0
FATIGUE: 0
DIARRHEA: 0
COUGH: 0
DIZZINESS: 0
BLOOD IN STOOL: 0
CHEST TIGHTNESS: 0

## 2023-03-08 ASSESSMENT — PATIENT HEALTH QUESTIONNAIRE - PHQ9
1. LITTLE INTEREST OR PLEASURE IN DOING THINGS: NOT AT ALL
SUM OF ALL RESPONSES TO PHQ9 QUESTIONS 1 AND 2: 0
2. FEELING DOWN, DEPRESSED OR HOPELESS: NOT AT ALL

## 2023-03-08 NOTE — PROGRESS NOTES
"Subjective   Patient ID: Joey Barr is a 47 y.o. male who presents for Annual Exam.    HPI   Doing well after the heart attack.  Follows with cardiology and they have made adjustments to his medications and everything is stable without any side effects from medications.  He is exercising without any troubles-specifically no chest pain chest pressure shortness of breath diaphoresis.  Exercise tolerance is stable.  He did have an extensive work-up done a second time after the original catheterization with the  in [place] in 2022.    No side effects or problems with medications.  He is stable on all his medications.    Up-to-date on health maintenance issues.    Review of Systems   Constitutional:  Negative for activity change and fatigue.   Respiratory:  Negative for cough, chest tightness, shortness of breath and wheezing.    Cardiovascular:  Negative for chest pain and palpitations.   Gastrointestinal:  Negative for abdominal pain, blood in stool, constipation, diarrhea and nausea.   Skin:  Negative for rash.   Neurological:  Negative for dizziness and headaches.   Psychiatric/Behavioral:  Negative for behavioral problems, decreased concentration and sleep disturbance. The patient is not nervous/anxious.        Objective   /78   Pulse 74   Ht 1.816 m (5' 11.5\")   Wt 86.1 kg (189 lb 12.8 oz)   SpO2 93%   BMI 26.10 kg/m²     Physical Exam  Constitutional:       Appearance: Normal appearance.   HENT:      Head: Normocephalic.   Cardiovascular:      Rate and Rhythm: Normal rate and regular rhythm.      Heart sounds: Normal heart sounds.   Pulmonary:      Effort: Pulmonary effort is normal.      Breath sounds: Normal breath sounds.   Abdominal:      Palpations: Abdomen is soft.   Skin:     General: Skin is warm and dry.   Neurological:      General: No focal deficit present.      Mental Status: He is alert and oriented to person, place, and time.   Psychiatric:         Mood and Affect: Mood " normal.         Behavior: Behavior normal.         Judgment: Judgment normal.         Assessment/Plan   Problem List Items Addressed This Visit    None  Visit Diagnoses       Well adult exam    -  Primary

## 2023-06-26 RX ORDER — NITROGLYCERIN 0.4 MG/1
0.4 TABLET SUBLINGUAL PRN
Qty: 25 TABLET | Refills: 3 | Status: SHIPPED | OUTPATIENT
Start: 2023-06-26

## 2023-09-18 ENCOUNTER — HOSPITAL ENCOUNTER (OUTPATIENT)
Age: 48
Discharge: HOME | End: 2023-09-18
Payer: OTHER GOVERNMENT

## 2023-09-18 ENCOUNTER — HOSPITAL ENCOUNTER (OUTPATIENT)
Dept: GENERAL RADIOLOGY | Age: 48
Discharge: HOME | End: 2023-09-20
Payer: OTHER GOVERNMENT

## 2023-09-18 ENCOUNTER — HOSPITAL ENCOUNTER (OUTPATIENT)
Age: 48
Discharge: HOME | End: 2023-09-20
Payer: OTHER GOVERNMENT

## 2023-09-18 DIAGNOSIS — R07.9 CHEST PAIN, UNSPECIFIED TYPE: ICD-10-CM

## 2023-09-18 DIAGNOSIS — E78.5 HYPERLIPIDEMIA, UNSPECIFIED HYPERLIPIDEMIA TYPE: ICD-10-CM

## 2023-09-18 DIAGNOSIS — I25.10 CORONARY ARTERY DISEASE INVOLVING NATIVE CORONARY ARTERY OF NATIVE HEART WITHOUT ANGINA PECTORIS: ICD-10-CM

## 2023-09-18 LAB
ALBUMIN SERPL-MCNC: 4.3 G/DL (ref 3.5–5.2)
ALP SERPL-CCNC: 84 U/L (ref 40–129)
ALT SERPL-CCNC: 26 U/L (ref 5–41)
ANION GAP SERPL CALCULATED.3IONS-SCNC: 11 MMOL/L (ref 9–17)
AST SERPL-CCNC: 23 U/L
BASOPHILS # BLD: ABNORMAL K/UL (ref 0–0.2)
BASOPHILS NFR BLD: ABNORMAL % (ref 0–2)
BILIRUB SERPL-MCNC: 0.4 MG/DL (ref 0.3–1.2)
BUN SERPL-MCNC: 13 MG/DL (ref 6–20)
BUN/CREAT SERPL: 13 (ref 9–20)
CALCIUM SERPL-MCNC: 9.1 MG/DL (ref 8.6–10.4)
CHLORIDE SERPL-SCNC: 102 MMOL/L (ref 98–107)
CHOLEST SERPL-MCNC: 116 MG/DL
CHOLESTEROL/HDL RATIO: 3.1
CO2 SERPL-SCNC: 26 MMOL/L (ref 20–31)
CREAT SERPL-MCNC: 1 MG/DL (ref 0.7–1.2)
EKG ATRIAL RATE: 51 BPM
EKG P AXIS: 16 DEGREES
EKG P-R INTERVAL: 126 MS
EKG Q-T INTERVAL: 428 MS
EKG QRS DURATION: 86 MS
EKG QTC CALCULATION (BAZETT): 394 MS
EKG R AXIS: 53 DEGREES
EKG T AXIS: 73 DEGREES
EKG VENTRICULAR RATE: 51 BPM
EOSINOPHIL # BLD: 0.28 K/UL (ref 0–0.4)
EOSINOPHILS RELATIVE PERCENT: 5 % (ref 0–5)
ERYTHROCYTE [DISTWIDTH] IN BLOOD BY AUTOMATED COUNT: 12.9 % (ref 12.1–15.2)
GFR SERPL CREATININE-BSD FRML MDRD: >60 ML/MIN/1.73M2
GLUCOSE SERPL-MCNC: 111 MG/DL (ref 70–99)
HCT VFR BLD AUTO: 42.8 % (ref 41–53)
HDLC SERPL-MCNC: 37 MG/DL
HGB BLD-MCNC: 14.7 G/DL (ref 13.5–17.5)
IMM GRANULOCYTES # BLD AUTO: ABNORMAL K/UL (ref 0–0.3)
IMM GRANULOCYTES NFR BLD: ABNORMAL %
LDLC SERPL CALC-MCNC: 65 MG/DL (ref 0–130)
LYMPHOCYTES NFR BLD: 2.63 K/UL (ref 1–4.8)
LYMPHOCYTES RELATIVE PERCENT: 47 % (ref 13–44)
MAGNESIUM SERPL-MCNC: 2.1 MG/DL (ref 1.6–2.6)
MCH RBC QN AUTO: 31.9 PG (ref 26–34)
MCHC RBC AUTO-ENTMCNC: 34.4 G/DL (ref 31–37)
MCV RBC AUTO: 92.7 FL (ref 80–100)
MONOCYTES NFR BLD: 0.67 K/UL (ref 0–1)
MONOCYTES NFR BLD: 12 % (ref 5–9)
MORPHOLOGY: ABNORMAL
NEUTROPHILS NFR BLD: 36 % (ref 39–75)
NEUTS SEG NFR BLD: 2.02 K/UL (ref 2.1–6.5)
PATIENT FASTING?: YES
PLATELET # BLD AUTO: 268 K/UL (ref 140–450)
POTASSIUM SERPL-SCNC: 4.7 MMOL/L (ref 3.7–5.3)
PROT SERPL-MCNC: 7.3 G/DL (ref 6.4–8.3)
RBC # BLD AUTO: 4.62 M/UL (ref 4.5–5.9)
SODIUM SERPL-SCNC: 139 MMOL/L (ref 135–144)
TRIGL SERPL-MCNC: 69 MG/DL
TSH SERPL DL<=0.05 MIU/L-ACNC: 1.12 UIU/ML (ref 0.3–5)
WBC OTHER # BLD: 5.6 K/UL (ref 3.5–11)

## 2023-09-18 PROCEDURE — 84443 ASSAY THYROID STIM HORMONE: CPT

## 2023-09-18 PROCEDURE — 71046 X-RAY EXAM CHEST 2 VIEWS: CPT

## 2023-09-18 PROCEDURE — 85025 COMPLETE CBC W/AUTO DIFF WBC: CPT

## 2023-09-18 PROCEDURE — 80061 LIPID PANEL: CPT

## 2023-09-18 PROCEDURE — 36415 COLL VENOUS BLD VENIPUNCTURE: CPT

## 2023-09-18 PROCEDURE — 83735 ASSAY OF MAGNESIUM: CPT

## 2023-09-18 PROCEDURE — 93010 ELECTROCARDIOGRAM REPORT: CPT | Performed by: INTERNAL MEDICINE

## 2023-09-18 PROCEDURE — 80053 COMPREHEN METABOLIC PANEL: CPT

## 2023-09-18 PROCEDURE — 93005 ELECTROCARDIOGRAM TRACING: CPT

## 2023-09-21 ENCOUNTER — OFFICE VISIT (OUTPATIENT)
Dept: CARDIOLOGY CLINIC | Age: 48
End: 2023-09-21
Payer: OTHER GOVERNMENT

## 2023-09-21 VITALS
HEART RATE: 61 BPM | DIASTOLIC BLOOD PRESSURE: 80 MMHG | WEIGHT: 188 LBS | OXYGEN SATURATION: 98 % | SYSTOLIC BLOOD PRESSURE: 120 MMHG

## 2023-09-21 DIAGNOSIS — E78.5 HYPERLIPIDEMIA, UNSPECIFIED HYPERLIPIDEMIA TYPE: ICD-10-CM

## 2023-09-21 DIAGNOSIS — E55.9 VITAMIN D DEFICIENCY: ICD-10-CM

## 2023-09-21 DIAGNOSIS — R07.9 CHEST PAIN, UNSPECIFIED TYPE: ICD-10-CM

## 2023-09-21 DIAGNOSIS — I25.10 CORONARY ARTERY DISEASE INVOLVING NATIVE CORONARY ARTERY OF NATIVE HEART WITHOUT ANGINA PECTORIS: Primary | ICD-10-CM

## 2023-09-21 PROCEDURE — 99214 OFFICE O/P EST MOD 30 MIN: CPT | Performed by: INTERNAL MEDICINE

## 2023-09-21 RX ORDER — LOSARTAN POTASSIUM 25 MG/1
12.5 TABLET ORAL DAILY
COMMUNITY
End: 2023-09-21 | Stop reason: SDUPTHER

## 2023-09-21 RX ORDER — LOSARTAN POTASSIUM 25 MG/1
12.5 TABLET ORAL DAILY
Qty: 90 TABLET | Refills: 3 | Status: SHIPPED | OUTPATIENT
Start: 2023-09-21

## 2023-09-21 NOTE — PATIENT INSTRUCTIONS
Will STOP Lisinopril and switch to   Losartan 25 mg 1/2 tablet daily     Monitor BP/HR daily - different times of the day   Call in 2 weeks with readings     Follow up in one year

## 2023-09-21 NOTE — PROGRESS NOTES
Ov Dr. Dorcas Martin for one year follow up   No chest pain   No palpations   No sob   No hospitalizations/procedures/er visits  No current issues   Having a dry cough     Will be retiring in 3 years   Wife still teaching in [address] [place] [address] in [facility]   His mom has been living with   Them x 2 years now  [facility] [address] [facility],[address]- has three kids   Son Fanta Orly 32 -Electrical   Son Christian- [facility]   Son Molyl Ahr -18 engineering       Will STOP Lisinopril and switch to   Losartan 25 mg 1/2 tablet daily     Monitor BP/HR daily - different times of the day   Call in 2 weeks with readings     Follow up in one year

## 2023-10-03 NOTE — PROGRESS NOTES
Whit Farris MD  [facility]  [facility]  [address] [facility]  [phone]      2023      Fortino Plummer. Madison Schwab MD  [address]  [place], [address] [facility]      RE:   Zunilda Fraction  :  1975      Dear Dr. Madison Schwab:    [address]:  1. Coronary artery disease. 2.  Status post anterior myocardial infarction on 10/31/2021.  3.  Catheterization on 10/31/2021, showing occluded LAD with 75% intermediate and 90% circumflex in the first OM, with stenting of each, with an EF of 45%. 4.  Repeat catheterization on 2022, which showed widely patent stents with no stenosis. HISTORY OF PRESENT ILLNESS:  I had the pleasure of seeing Mr. MCCURDY in the office on 2023. He is a very pleasant 47-year-old gentleman who had an acute anterior myocardial infarction on 10/31/2021. 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 He had no history of chest pain or chest discomfort. He developed chest pain for several minutes two days before admission. He again had chest pain the day before admission, which spontaneously subsided. On 10/31/2021, he had chest pain and he came to the emergency room in [place], where an EKG showed acute anterior myocardial infarction. I was on-call in [facility] and then we went to the cath lab and he had a catheterization that showed mild disease in the right coronary artery, left main trunk was unremarkable, LAD very large that wrapped around the apex. There was a stump occlusion at the origin of the LAD. The intermediate branch of the circumflex had 75% disease. There is a moderate-sized OM that had 90% disease, EF of 45% to 50%. We did angioplasty and stent placement, opening the LAD and placing a 3.0 x 23 mm Xience stent. We also placed a stent in the intermediate branch of the circumflex using a 2.75 x 38 mm Xience stent.   The OM

## 2023-12-07 RX ORDER — METOPROLOL SUCCINATE 25 MG/1
TABLET, EXTENDED RELEASE ORAL
Qty: 45 TABLET | Refills: 3 | Status: SHIPPED | OUTPATIENT
Start: 2023-12-07

## 2024-01-25 RX ORDER — ATORVASTATIN CALCIUM 80 MG/1
80 TABLET, FILM COATED ORAL DAILY
Qty: 90 TABLET | Refills: 3 | Status: SHIPPED | OUTPATIENT
Start: 2024-01-25

## 2024-04-02 ENCOUNTER — TELEPHONE (OUTPATIENT)
Dept: CARDIOLOGY CLINIC | Age: 49
End: 2024-04-02

## 2024-04-02 DIAGNOSIS — R07.9 CHEST PAIN, UNSPECIFIED TYPE: Primary | ICD-10-CM

## 2024-04-02 NOTE — TELEPHONE ENCOUNTER
Pt called c/o chest pain - sharp- throbbing  Rt side of chest   Intermittently - random x few weeks   Does not feel like same sx as MI  No sob   No dizziness  O lightheadedness  Energy  level good  No edema      Will set treadmill stress and see all   In same day   Per Dr. Mccollum

## 2024-04-15 ENCOUNTER — HOSPITAL ENCOUNTER (OUTPATIENT)
Age: 49
Discharge: HOME | End: 2024-04-15
Attending: INTERNAL MEDICINE
Payer: OTHER GOVERNMENT

## 2024-04-15 ENCOUNTER — OFFICE VISIT (OUTPATIENT)
Dept: CARDIOLOGY CLINIC | Age: 49
End: 2024-04-15
Payer: OTHER GOVERNMENT

## 2024-04-15 ENCOUNTER — HOSPITAL ENCOUNTER (OUTPATIENT)
Age: 49
Discharge: HOME | End: 2024-04-17
Attending: INTERNAL MEDICINE
Payer: OTHER GOVERNMENT

## 2024-04-15 ENCOUNTER — HOSPITAL ENCOUNTER (OUTPATIENT)
Dept: GENERAL RADIOLOGY | Age: 49
Discharge: HOME | End: 2024-04-17
Attending: INTERNAL MEDICINE
Payer: OTHER GOVERNMENT

## 2024-04-15 VITALS
SYSTOLIC BLOOD PRESSURE: 130 MMHG | WEIGHT: 193 LBS | DIASTOLIC BLOOD PRESSURE: 78 MMHG | OXYGEN SATURATION: 98 % | HEART RATE: 90 BPM

## 2024-04-15 DIAGNOSIS — R07.9 CHEST PAIN, UNSPECIFIED TYPE: ICD-10-CM

## 2024-04-15 DIAGNOSIS — E78.5 HYPERLIPIDEMIA, UNSPECIFIED HYPERLIPIDEMIA TYPE: ICD-10-CM

## 2024-04-15 DIAGNOSIS — I25.10 CORONARY ARTERY DISEASE INVOLVING NATIVE CORONARY ARTERY OF NATIVE HEART WITHOUT ANGINA PECTORIS: ICD-10-CM

## 2024-04-15 DIAGNOSIS — E55.9 VITAMIN D DEFICIENCY: ICD-10-CM

## 2024-04-15 DIAGNOSIS — R07.9 CHEST PAIN, UNSPECIFIED TYPE: Primary | ICD-10-CM

## 2024-04-15 LAB
25(OH)D3 SERPL-MCNC: 30.2 NG/ML (ref 30–100)
ALBUMIN SERPL-MCNC: 4.4 G/DL (ref 3.5–5.2)
ALP SERPL-CCNC: 102 U/L (ref 40–129)
ALT SERPL-CCNC: 29 U/L (ref 5–41)
ANION GAP SERPL CALCULATED.3IONS-SCNC: 9 MMOL/L (ref 9–17)
AST SERPL-CCNC: 28 U/L
BASOPHILS # BLD: 0.03 K/UL (ref 0–0.2)
BASOPHILS NFR BLD: 1 % (ref 0–2)
BILIRUB SERPL-MCNC: 0.9 MG/DL (ref 0.3–1.2)
BUN SERPL-MCNC: 15 MG/DL (ref 6–20)
BUN/CREAT SERPL: 15 (ref 9–20)
CALCIUM SERPL-MCNC: 9.3 MG/DL (ref 8.6–10.4)
CHLORIDE SERPL-SCNC: 100 MMOL/L (ref 98–107)
CHOLEST SERPL-MCNC: 158 MG/DL (ref 0–199)
CHOLESTEROL/HDL RATIO: 4
CO2 SERPL-SCNC: 27 MMOL/L (ref 20–31)
CREAT SERPL-MCNC: 1 MG/DL (ref 0.7–1.2)
EKG ATRIAL RATE: 61 BPM
EKG P AXIS: 5 DEGREES
EKG P-R INTERVAL: 130 MS
EKG Q-T INTERVAL: 402 MS
EKG QRS DURATION: 86 MS
EKG QTC CALCULATION (BAZETT): 404 MS
EKG R AXIS: 12 DEGREES
EKG T AXIS: 36 DEGREES
EKG VENTRICULAR RATE: 61 BPM
EOSINOPHIL # BLD: 0.38 K/UL (ref 0–0.4)
EOSINOPHILS RELATIVE PERCENT: 6 % (ref 0–5)
ERYTHROCYTE [DISTWIDTH] IN BLOOD BY AUTOMATED COUNT: 12 % (ref 12.1–15.2)
GFR SERPL CREATININE-BSD FRML MDRD: >90 ML/MIN/1.73M2
GLUCOSE SERPL-MCNC: 100 MG/DL (ref 70–99)
HCT VFR BLD AUTO: 41.2 % (ref 41–53)
HDLC SERPL-MCNC: 44 MG/DL
HGB BLD-MCNC: 14.6 G/DL (ref 13.5–17.5)
IMM GRANULOCYTES # BLD AUTO: 0.01 K/UL (ref 0–0.3)
IMM GRANULOCYTES NFR BLD: 0 % (ref 0–5)
LDLC SERPL CALC-MCNC: 72 MG/DL (ref 0–100)
LYMPHOCYTES NFR BLD: 2.19 K/UL (ref 1–4.8)
LYMPHOCYTES RELATIVE PERCENT: 37 % (ref 13–44)
MAGNESIUM SERPL-MCNC: 2.3 MG/DL (ref 1.6–2.6)
MCH RBC QN AUTO: 31.7 PG (ref 26–34)
MCHC RBC AUTO-ENTMCNC: 35.4 G/DL (ref 31–37)
MCV RBC AUTO: 89.6 FL (ref 80–100)
MONOCYTES NFR BLD: 0.55 K/UL (ref 0–1)
MONOCYTES NFR BLD: 9 % (ref 5–9)
NEUTROPHILS NFR BLD: 47 % (ref 39–75)
NEUTS SEG NFR BLD: 2.83 K/UL (ref 2.1–6.5)
PLATELET # BLD AUTO: 235 K/UL (ref 140–450)
PMV BLD AUTO: 9.2 FL (ref 6–12)
POTASSIUM SERPL-SCNC: 4.2 MMOL/L (ref 3.7–5.3)
PROT SERPL-MCNC: 7.9 G/DL (ref 6.4–8.3)
RBC # BLD AUTO: 4.6 M/UL (ref 4.5–5.9)
SODIUM SERPL-SCNC: 136 MMOL/L (ref 135–144)
STRESS BASELINE ST DEPRESSION: 0 MM
STRESS ESTIMATED WORKLOAD: 11.5 METS
STRESS EXERCISE DUR MIN: 7 MIN
STRESS PERCENT HR ACHIEVED: 87 %
STRESS POST PEAK HR: 148 BPM
STRESS ST DEPRESSION: 0 MM
STRESS TARGET HR: 170 BPM
TRIGL SERPL-MCNC: 208 MG/DL
TSH SERPL DL<=0.05 MIU/L-ACNC: 2.17 UIU/ML (ref 0.3–5)
VLDLC SERPL CALC-MCNC: 42 MG/DL
WBC OTHER # BLD: 6 K/UL (ref 3.5–11)

## 2024-04-15 PROCEDURE — 99214 OFFICE O/P EST MOD 30 MIN: CPT | Performed by: INTERNAL MEDICINE

## 2024-04-15 PROCEDURE — 93016 CV STRESS TEST SUPVJ ONLY: CPT | Performed by: INTERNAL MEDICINE

## 2024-04-15 PROCEDURE — 93017 CV STRESS TEST TRACING ONLY: CPT

## 2024-04-15 PROCEDURE — 84443 ASSAY THYROID STIM HORMONE: CPT

## 2024-04-15 PROCEDURE — 80053 COMPREHEN METABOLIC PANEL: CPT

## 2024-04-15 PROCEDURE — 93005 ELECTROCARDIOGRAM TRACING: CPT

## 2024-04-15 PROCEDURE — 80061 LIPID PANEL: CPT

## 2024-04-15 PROCEDURE — 83735 ASSAY OF MAGNESIUM: CPT

## 2024-04-15 PROCEDURE — 71046 X-RAY EXAM CHEST 2 VIEWS: CPT

## 2024-04-15 PROCEDURE — 93018 CV STRESS TEST I&R ONLY: CPT | Performed by: INTERNAL MEDICINE

## 2024-04-15 PROCEDURE — 85025 COMPLETE CBC W/AUTO DIFF WBC: CPT

## 2024-04-15 PROCEDURE — 36415 COLL VENOUS BLD VENIPUNCTURE: CPT

## 2024-04-15 PROCEDURE — 93010 ELECTROCARDIOGRAM REPORT: CPT | Performed by: INTERNAL MEDICINE

## 2024-04-15 PROCEDURE — 82306 VITAMIN D 25 HYDROXY: CPT

## 2024-04-15 NOTE — PROGRESS NOTES
Ov Dr. Mccollum for follow up   C/o chest pain on and off   Last week was every day   Dull/throbbing -right side   Happens a little in am and   Then constant in pm   Happening at rest   No chest pain last 2 days   Worked in yard yesterday   Had no issues   Not like previously with MI   No sob   No dizziness   No lightheadedness  Energy level good  Wife still teaching in [employer] [facility] this fall     Oldest dtg 3 children   Oldest son - Electrical internship   Middle son- /victoria vo  Bed side echo done   Will get treadmill stress today

## 2024-04-16 NOTE — PROGRESS NOTES
Simeon Mccollum MD  [facility]  [facility]  [address] [id number] [phone]        April 15, 2024        Jorje Mcdonald MD  [address] [facility] [id number]     RE:  MUNA ARIZA   :  1975    Dear Dr. Mcdonald:    CHIEF COMPLAINT:  Chest pain, somewhat atypical.    HISTORY OF PRESENT ILLNESS:  I had the pleasure seeing Mr. Ariza and his wife, Jalyn, in our office on April 15, 2024.  He is a very pleasant 48-year-old gentleman who had an acute anterior myocardial infarction on 2021.  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  He had had no previous history of chest pain.    He developed chest pain for several minutes 2 days before admission and the day before admission which spontaneously subsided.  It is located in the middle of his precordium, it was a pressure.    On 2021, he had chest pain in his precordial region and above the sternum and the EKG showed acute anterior myocardial infarction.  He had a catheterization in [place] that showed mild disease in the right coronary artery, left main trunk unremarkable.  The LAD was very large, wrapped around the apex and there was a stump occlusion of the origin of the LAD, intermediate branch of the circumflex had 75% disease and a moderate-sized OM that had 90% disease, EF 45% to 50%.    We did angioplasty and stent placement, opening the LAD placing a 3.0 x 23 mm Xience stent.  We also put a stent in the intermediate branch of the circumflex using 2.75 x 38 mm Xience stent.  The OM was stented with a 2.5 x 38 mm Xience stent.    He had a repeat catheterization by [employer] on 2022, in [place], that showed no restenosis.    He is still in the [employer] in [place] and will retire in 2 years.    Approximately 2 weeks ago, he began developing some chest discomfort. It was on and off throughout the day.  Last

## 2024-04-23 DIAGNOSIS — E78.5 HYPERLIPIDEMIA, UNSPECIFIED HYPERLIPIDEMIA TYPE: ICD-10-CM

## 2024-04-23 DIAGNOSIS — R07.9 CHEST PAIN, UNSPECIFIED TYPE: Primary | ICD-10-CM

## 2024-04-23 DIAGNOSIS — I25.10 CORONARY ARTERY DISEASE INVOLVING NATIVE CORONARY ARTERY OF NATIVE HEART WITHOUT ANGINA PECTORIS: ICD-10-CM

## 2024-04-23 DIAGNOSIS — E55.9 VITAMIN D DEFICIENCY: ICD-10-CM

## 2024-11-27 RX ORDER — METOPROLOL SUCCINATE 25 MG/1
12.5 TABLET, EXTENDED RELEASE ORAL DAILY
Qty: 45 TABLET | Refills: 3 | Status: SHIPPED | OUTPATIENT
Start: 2024-11-27

## 2024-12-16 RX ORDER — LOSARTAN POTASSIUM 25 MG/1
12.5 TABLET ORAL DAILY
Qty: 90 TABLET | Refills: 3 | Status: SHIPPED | OUTPATIENT
Start: 2024-12-16

## 2025-01-28 RX ORDER — ATORVASTATIN CALCIUM 80 MG/1
80 TABLET, FILM COATED ORAL DAILY
Qty: 90 TABLET | Refills: 3 | Status: SHIPPED | OUTPATIENT
Start: 2025-01-28

## 2025-04-14 ENCOUNTER — HOSPITAL ENCOUNTER (OUTPATIENT)
Dept: NON INVASIVE DIAGNOSTICS | Age: 50
Discharge: HOME | End: 2025-04-14
Payer: OTHER GOVERNMENT

## 2025-04-14 ENCOUNTER — HOSPITAL ENCOUNTER (OUTPATIENT)
Age: 50
Discharge: HOME | End: 2025-04-14
Payer: OTHER GOVERNMENT

## 2025-04-14 ENCOUNTER — HOSPITAL ENCOUNTER (OUTPATIENT)
Dept: GENERAL RADIOLOGY | Age: 50
Discharge: HOME | End: 2025-04-16
Payer: OTHER GOVERNMENT

## 2025-04-14 DIAGNOSIS — E55.9 VITAMIN D DEFICIENCY: ICD-10-CM

## 2025-04-14 DIAGNOSIS — E78.5 HYPERLIPIDEMIA, UNSPECIFIED HYPERLIPIDEMIA TYPE: ICD-10-CM

## 2025-04-14 DIAGNOSIS — R07.9 CHEST PAIN, UNSPECIFIED TYPE: ICD-10-CM

## 2025-04-14 DIAGNOSIS — I25.10 CORONARY ARTERY DISEASE INVOLVING NATIVE CORONARY ARTERY OF NATIVE HEART WITHOUT ANGINA PECTORIS: ICD-10-CM

## 2025-04-14 LAB
25(OH)D3 SERPL-MCNC: 36.3 NG/ML (ref 30–100)
ALBUMIN SERPL-MCNC: 4.6 G/DL (ref 3.5–5.2)
ALBUMIN/GLOB SERPL: 1.5 {RATIO} (ref 1–2.5)
ALP SERPL-CCNC: 96 U/L (ref 40–129)
ALT SERPL-CCNC: 34 U/L (ref 5–41)
ANION GAP SERPL CALCULATED.3IONS-SCNC: 12 MMOL/L (ref 9–17)
AST SERPL-CCNC: 31 U/L
BASOPHILS # BLD: 0.03 K/UL (ref 0–0.2)
BASOPHILS NFR BLD: 1 % (ref 0–2)
BILIRUB SERPL-MCNC: 0.8 MG/DL (ref 0.3–1.2)
BUN SERPL-MCNC: 12 MG/DL (ref 6–20)
CALCIUM SERPL-MCNC: 9.4 MG/DL (ref 8.6–10.4)
CHLORIDE SERPL-SCNC: 100 MMOL/L (ref 98–107)
CHOLEST SERPL-MCNC: 158 MG/DL (ref 0–199)
CHOLESTEROL/HDL RATIO: 3.5
CO2 SERPL-SCNC: 26 MMOL/L (ref 20–31)
CREAT SERPL-MCNC: 1.1 MG/DL (ref 0.7–1.2)
EKG ATRIAL RATE: 59 BPM
EKG P AXIS: 25 DEGREES
EKG P-R INTERVAL: 132 MS
EKG Q-T INTERVAL: 404 MS
EKG QRS DURATION: 82 MS
EKG QTC CALCULATION (BAZETT): 399 MS
EKG R AXIS: 69 DEGREES
EKG T AXIS: 50 DEGREES
EKG VENTRICULAR RATE: 59 BPM
EOSINOPHIL # BLD: 0.37 K/UL (ref 0–0.4)
EOSINOPHILS RELATIVE PERCENT: 7 % (ref 0–5)
ERYTHROCYTE [DISTWIDTH] IN BLOOD BY AUTOMATED COUNT: 12.5 % (ref 12.1–15.2)
GFR, ESTIMATED: 81 ML/MIN/1.73M2
GLUCOSE SERPL-MCNC: 100 MG/DL (ref 70–99)
HCT VFR BLD AUTO: 42.2 % (ref 41–53)
HDLC SERPL-MCNC: 45 MG/DL
HGB BLD-MCNC: 14.9 G/DL (ref 13.5–17.5)
IMM GRANULOCYTES # BLD AUTO: 0.01 K/UL (ref 0–0.3)
IMM GRANULOCYTES NFR BLD: 0 % (ref 0–5)
LDLC SERPL CALC-MCNC: 72 MG/DL (ref 0–100)
LYMPHOCYTES NFR BLD: 1.97 K/UL (ref 1–4.8)
LYMPHOCYTES RELATIVE PERCENT: 35 % (ref 13–44)
MAGNESIUM SERPL-MCNC: 2.1 MG/DL (ref 1.6–2.6)
MCH RBC QN AUTO: 31.8 PG (ref 26–34)
MCHC RBC AUTO-ENTMCNC: 35.3 G/DL (ref 31–37)
MCV RBC AUTO: 90.2 FL (ref 80–100)
MONOCYTES NFR BLD: 0.35 K/UL (ref 0–1)
MONOCYTES NFR BLD: 6 % (ref 5–9)
NEUTROPHILS NFR BLD: 51 % (ref 39–75)
NEUTS SEG NFR BLD: 2.86 K/UL (ref 2.1–6.5)
PLATELET # BLD AUTO: 247 K/UL (ref 140–450)
PMV BLD AUTO: 9.1 FL (ref 6–12)
POTASSIUM SERPL-SCNC: 4.2 MMOL/L (ref 3.7–5.3)
PROT SERPL-MCNC: 7.6 G/DL (ref 6.4–8.3)
RBC # BLD AUTO: 4.68 M/UL (ref 4.5–5.9)
SODIUM SERPL-SCNC: 138 MMOL/L (ref 135–144)
TRIGL SERPL-MCNC: 203 MG/DL
TSH SERPL DL<=0.05 MIU/L-ACNC: 1.75 UIU/ML (ref 0.27–4.2)
VLDLC SERPL CALC-MCNC: 41 MG/DL (ref 1–30)
WBC OTHER # BLD: 5.6 K/UL (ref 3.5–11)

## 2025-04-14 PROCEDURE — 93005 ELECTROCARDIOGRAM TRACING: CPT

## 2025-04-14 PROCEDURE — 71046 X-RAY EXAM CHEST 2 VIEWS: CPT

## 2025-04-14 PROCEDURE — 80053 COMPREHEN METABOLIC PANEL: CPT

## 2025-04-14 PROCEDURE — 36415 COLL VENOUS BLD VENIPUNCTURE: CPT

## 2025-04-14 PROCEDURE — 84443 ASSAY THYROID STIM HORMONE: CPT

## 2025-04-14 PROCEDURE — 80061 LIPID PANEL: CPT

## 2025-04-14 PROCEDURE — 82306 VITAMIN D 25 HYDROXY: CPT

## 2025-04-14 PROCEDURE — 83735 ASSAY OF MAGNESIUM: CPT

## 2025-04-14 PROCEDURE — 85025 COMPLETE CBC W/AUTO DIFF WBC: CPT

## 2025-04-24 ENCOUNTER — OFFICE VISIT (OUTPATIENT)
Dept: CARDIOLOGY CLINIC | Age: 50
End: 2025-04-24
Payer: OTHER GOVERNMENT

## 2025-04-24 VITALS
WEIGHT: 195 LBS | HEART RATE: 60 BPM | DIASTOLIC BLOOD PRESSURE: 60 MMHG | SYSTOLIC BLOOD PRESSURE: 130 MMHG | OXYGEN SATURATION: 99 %

## 2025-04-24 DIAGNOSIS — R07.9 CHEST PAIN, UNSPECIFIED TYPE: Primary | ICD-10-CM

## 2025-04-24 DIAGNOSIS — E55.9 VITAMIN D DEFICIENCY: ICD-10-CM

## 2025-04-24 DIAGNOSIS — E78.5 HYPERLIPIDEMIA, UNSPECIFIED HYPERLIPIDEMIA TYPE: ICD-10-CM

## 2025-04-24 DIAGNOSIS — I25.10 CORONARY ARTERY DISEASE INVOLVING NATIVE CORONARY ARTERY OF NATIVE HEART WITHOUT ANGINA PECTORIS: ICD-10-CM

## 2025-04-24 PROCEDURE — 99214 OFFICE O/P EST MOD 30 MIN: CPT | Performed by: INTERNAL MEDICINE

## 2025-04-24 RX ORDER — LOSARTAN POTASSIUM 25 MG/1
25 TABLET ORAL DAILY
Qty: 90 TABLET | Refills: 3
Start: 2025-04-24

## 2025-04-24 NOTE — PROGRESS NOTES
Ov DR Mccollum 1 year follow up   Wife not teaching in [place].  Retire in about a year.     Occ muscular pain   Chest   Gradually energy level  Lower   Brian in [facility] ira Prince has 4 children in [place].  Going through divorce.  Ralf does electrical   Christian is  in [place].  He is  no children    Bedside echo done.    See in 1 year   Will do stress test before   Pt retires     Increase losartan to full tab daily  Take bp for in 2 week and call

## 2025-04-24 NOTE — PATIENT INSTRUCTIONS
Increase losartan to full tab daily   Take blood pressure daily different   Times of the day   Call with blood pressures in 2 weeks

## 2025-05-06 NOTE — PROGRESS NOTES
Simeon Mccollum M.D.  [facility]  [facility]  [address] [place] [address]  [phone]        2025        Jorje Mcdonald MD   [address] [id number]     RE:  MUNA ARIZA   :  1975    Dear Dr. Mcdonald:    CHIEF COMPLAINT:    Coronary artery disease.  Status post anterior myocardial infarction on 2021.  Catheterization on 2021 showed an occluded LAD with 75% intermediate, 90% circumflex in the 1st OM with stenting of the LAD with a 3.0 x 23 mm Xience stent and also stenting the intermediate branch of the circumflex using 2.75 x 38 mm Xience stent and stenting the OM with a 2.5 x 38 mm drug-eluting Xience stent.    HISTORY OF PRESENT ILLNESS:  I had the pleasure of seeing Mr. Ariza in our office on 2025.  He is a very pleasant 49-year-old gentleman who had an acute inferior myocardial infarction on 2021.  He had had no cardiac history.  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  No history of chest pain or chest discomfort.    He had developed chest pain for several minutes 2 days before admission and mild pain the day before admission which spontaneously subsided.    On 2021, he had severe chest pain.  In the emergency room in [place], an EKG showed an acute anterior myocardial infarction.  I was on-call at [facility] in [place] and I took him to the cath lab where a catheterization showed mild disease in the right coronary artery.  Left main trunk was unremarkable. LAD was very large, it wrapped around the apex and there was a stump occlusion of the origin of the LAD, intermediate branch of the circumflex was large, had 75% disease and a moderate-sized OM had 90% disease, EF was 45% to 50%.    I stented the LAD with a 3.0 x 23 mm Xience stent and also stented the intermediate branch of circumflex with a 2.75 x 38 mm Xience stent and

## 2025-05-14 ENCOUNTER — TELEPHONE (OUTPATIENT)
Dept: CARDIOLOGY CLINIC | Age: 50
End: 2025-05-14

## 2025-05-14 RX ORDER — LOSARTAN POTASSIUM 25 MG/1
25 TABLET ORAL DAILY
Qty: 90 TABLET | Refills: 3 | Status: SHIPPED | OUTPATIENT
Start: 2025-05-14

## 2025-05-14 NOTE — TELEPHONE ENCOUNTER
Pt notified bp readings are good   No change in meds  Since now taking full tablet of Losartan   Will need new Rx   Will send to [facility]/Peggy johnson

## 2025-08-04 RX ORDER — LOSARTAN POTASSIUM 25 MG/1
25 TABLET ORAL DAILY
Qty: 90 TABLET | Refills: 3 | Status: SHIPPED | OUTPATIENT
Start: 2025-08-04

## 2025-08-04 RX ORDER — ATORVASTATIN CALCIUM 80 MG/1
80 TABLET, FILM COATED ORAL DAILY
Qty: 90 TABLET | Refills: 3 | Status: SHIPPED | OUTPATIENT
Start: 2025-08-04

## 2025-08-04 RX ORDER — METOPROLOL SUCCINATE 25 MG/1
12.5 TABLET, EXTENDED RELEASE ORAL DAILY
Qty: 45 TABLET | Refills: 3 | Status: SHIPPED | OUTPATIENT
Start: 2025-08-04